# Patient Record
Sex: FEMALE | Employment: STUDENT | URBAN - METROPOLITAN AREA
[De-identification: names, ages, dates, MRNs, and addresses within clinical notes are randomized per-mention and may not be internally consistent; named-entity substitution may affect disease eponyms.]

---
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
## 2024-02-21 ENCOUNTER — OFFICE VISIT (OUTPATIENT)
Dept: SURGERY | Facility: CLINIC | Age: 19
End: 2024-02-21

## 2024-02-21 DIAGNOSIS — R82.90 URINE FINDING: ICD-10-CM

## 2024-02-21 DIAGNOSIS — N30.90 RECURRENT CYSTITIS: Primary | ICD-10-CM

## 2024-02-21 LAB
APPEARANCE: CLEAR
BILIRUBIN: NEGATIVE
GLUCOSE (URINE DIPSTICK): NEGATIVE MG/DL
KETONES (URINE DIPSTICK): NEGATIVE MG/DL
MULTISTIX LOT#: ABNORMAL NUMERIC
NITRITE, URINE: NEGATIVE
PH, URINE: 7 (ref 4.5–8)
PROTEIN (URINE DIPSTICK): NEGATIVE MG/DL
SPECIFIC GRAVITY: 1.02 (ref 1–1.03)
URINE-COLOR: YELLOW
UROBILINOGEN,SEMI-QN: 0.2 MG/DL (ref 0–1.9)

## 2024-02-21 PROCEDURE — 99243 OFF/OP CNSLTJ NEW/EST LOW 30: CPT | Performed by: PHYSICIAN ASSISTANT

## 2024-02-21 PROCEDURE — 81002 URINALYSIS NONAUTO W/O SCOPE: CPT | Performed by: PHYSICIAN ASSISTANT

## 2024-02-21 NOTE — PROGRESS NOTES
[facility], [facility]    Urology Consult Note    History of Present Illness:   Patient is a 18 year old female who presents today for consultation from Dr. Hester's office for recurrent cystitis symptoms.     Patient notes urinary urgency, bloating sensation, cramping sensation. Patient notes that she noticed a thin white discharge, occasional irritation.  Improves with pushing water. Currently consuming 32 oz water daily. No caffeine currently, didn't note that it got any worse. EtOH does seem to make it worse.   Regular bowel movements.   Started developing a few days ago, no current symptoms.   AZO bladder pain     Patient seen by gynecolgist 1/19/24 with note as follows:  Pt here with c/o recurrent urinary urgency, pelvic discomfort, dysuria x 4 mo.  Became sexually active 4 mo ago.  Dx'd with e-coli UTI in 10/2023, tx'd with macrobid with relief, but sx recurrent with next sexual activity (~2-3 weeks later)  Pt has had 2 negative follow up urine cultures through PCP since UTI tx of 10/2023 both of which were neg.  Chl/gc and affirm also neg (1/16/24).  Using nuvaring and condoms for contraception.   PLAN:   Repeat urine c/s, add testing for ureaplasma.  Discontinue use of latex condoms and any spermicide.  If above testing neg and sx persisting, will have pt f/u with urology.     HISTORY:  Past Medical History:   Diagnosis Date    Heart murmur     Panic attack       No past surgical history on file.   Family History   Problem Relation Age of Onset    Cancer Paternal Grandfather         Lung Cancer     Breast Cancer Maternal Great-Grandmother     Cancer Maternal Cousin Female         Thyroid Cancer     Breast Cancer Maternal Cousin Female       Social History:   Social History     Socioeconomic History    Marital status: Single   Tobacco Use    Smoking status: Never    Smokeless tobacco: Never   Vaping Use    Vaping Use: Never used   Substance and Sexual Activity    Alcohol  use: No     Alcohol/week: 0.0 standard drinks of alcohol    Drug use: No    Sexual activity: Never        Allergies  Allergies   Allergen Reactions    Honey RASH    Penicillins ANAPHYLAXIS    Dust Runny nose     Watery eyes        Review of Systems:   A 10-point review of systems was completed and is negative other than as noted above.    Physical Exam:   There were no vitals taken for this visit.    GENERAL APPEARANCE: well developed, well nourished, in no acute distress  NEUROLOGIC: no localizing neurologic signs, alert and oriented x 3, converses appropriately  HEAD: atraumatic, normocephalic  EYES: sclera non-icteric  ORAL CAVITY: mucosa moist  NECK/THYROID: no obvious masses or goiter  LUNGS: non-labored breathing  ABDOMEN: soft, nontender, nondistended  EXTREMITIES: warm, well-perfused. No clubbing, cyanosis or edema.  SKIN: no obvious rashes    Results:     Laboratory Data:  Lab Results   Component Value Date    WBC 6.1 02/16/2022    HGB 12.4 02/16/2022    .0 02/16/2022     Lab Results   Component Value Date     02/16/2022    K 3.7 02/16/2022     02/16/2022    CO2 25.0 02/16/2022    BUN 7 02/16/2022    GLU 84 02/16/2022    GFRAA 107 02/16/2022    AST 21 02/16/2022    ALT 46 02/16/2022    TP 7.2 02/16/2022    ALB 4.5 02/16/2022    CA 9.4 02/16/2022       Urinalysis Results (last three years):  Recent Labs     02/21/24  1314   SPECGRAVITY 1.020   PHURINE 7.0   NITRITE Negative       Urine Culture Results (last three years):  No results found for: \"URINECUL\"    Imaging  No results found.      Impression:     Patient is a 18 year old female who presents today for consultation from Dr. Hester's office for recurrent cystitis symptoms.     We reviewed cystitis tx and prevention strategies at today's visit and I provided educational materials for this. Discussed dietery and behavioral issues including cranberry / extract pills / supplements, fluids, voiding technique, post coital hygiene. Double  voiding, bending over after void to completely empty. I encouraged the patient to drink at least 40-60 oz water per day or enough to keep urine clear. I also reviewed dietary irritants and provided educational materials for this. We also discussed trying prn AZO for pain relief with plenty of water.    Will obtain renal/bladder ultrasound.     Recommendations:  [facility] PCR testing with STI. Renal/bladder ultrasound.  Recommend probiotic daily, 48 oz of water daily.   Pending above results will make further recommendations.     Thank you very much for this consult. Please call if there are any questions or concerns.     Leah Hines PA-C  Urology  [facility]    Date: 2/21/2024

## 2024-02-21 NOTE — PATIENT INSTRUCTIONS
Dietary Irritants    What you can do to help relieve your symptoms:    The purpose of this handout is to provide information that can help you discover what you are ingesting or doing that may be contributing to your recurrent irritative voiding symptoms and what steps you can take to relieve your discomfort.     Frequency, urgency, and pain on urination are symptoms of inflammation or irritation. These symptoms can be caused by bacterial infections or substances in the urine causing irritation to the lining of the bladder or urethra. Bacterial infections can be treated with antibiotics. But irritation of the bladder or urethra can results from other cause that will probably not respond to antibiotics.    What to do at the first sign of bladder or urethral discomfort:    The basic treatment for irritable bladder symptoms, whether induced by bacterial or nonbacterial irritants, is hydration. At the first sign of discomfort, start drinking 16 oz of water mixed with 1 teaspoon of baking soda. Then drink 8 oz of plain water every 20 minutes for the next 2-3 hours.     Repeat drinking 16 oz of water with baking soda in 3-4 hours. (Baking soda contains sodium and can cause fluid retention. If you have a history of high blood pressure, congestive heart failure, or renal disease, you should not use more than twice in 24 hours.)    You can take acetaminophen to relieve the pain (two extra strength or three regular strength tablets). Bladder analgesics such as phenazopyridine (Pyridium) may help and will not alter the results of a urine culture should the symptoms not be significantly relieved by diluting the urine and flushing out the bladder.     A warm bath to help muscles of the pelvic floor relax will also help lessen discomfort.     Before starting any antibiotic, a urine culture must be taken. If the conservative measures mentioned above are not helping, call the office to arrange for a urine culture. If the specimen  is contaminated with vaginal cells and bacteria and you are severely symptomatic, then a catheterized specimen should be obtained directly from your bladder to determine precisely whether bacterial infection is the cause of your symptoms.     Dietary irritants to the urinary tract to avoid:    Certain food can contribute to urinary frequency, urgency, and discomfort. If bladder symptoms are related to dietary factors, strict adherence to a diet that eliminated the food should bring significant relief in 10 days. Once you are feeling better, you can begin to add foods back into your diet one at a time. If the symptoms return, you will be able to identify the irritant. As you add foods back to your diet it is very important that you drink significant amounts of water. These foods are acidic and are considered irritants to the bladder. They should be avoided.    Alcoholic beverages  Apples and apple juice  Cantaloupe  Carbonated beverages  Chili and spicy foods  Citrus fruit  Chocolate  Coffee (including decaf)  Cranberries and juice  Grapes  Guava  Peaches  Pineapple  Plums  Strawberries  Sugar*  Tea  Tomatoes  Vit B Complex  Vinegar  *Some women report that sugar flares their symptoms.    Low acid fruit substitutions include apricots, papaya, pears, and watermelon. Coffee drinkers can drink Kava or other low-acid instant drinks. Tea drinks can substitute non-citrus herbal and sun brewed teas. Calcium carbonate co-buffered with calcium ascorbate can be substituted for Vitamin C.     Urinary tract infections can affect your general health and your quality of life.  Some UTI’s can be prevented.  Here are some suggestions that my help prevent frequent UTI’s.     Good Hygiene: Always wipe front to back.  Don’t use perfumed soaps.  Plain soaps like Ivory are the best.  Don’t use washcloths.  Place soap directly on your hands and clean the genital area.  Rinse thoroughly.  Soap can be very irritating to the vaginal  mucosa.     Urination: Urinate every 2-3 hours during the day.  Empty your bladder just before going to bed and  first thing when you wake up.  Make sure you go to the bathroom when you have a strong urge. Don't hover over the toilet to urinate.  The bladder may not be completely emptying when using this technique.  Sometimes you may need to \"double-void\".  After you finish urinating, stand up, then sit back down to urinate again.     Clothing: Wear cotton underwear. Change daily.  Avoid tight jeans.       [place]: Sometimes UTI’s are related to intercourse.  Wash genital area before and rinse afterwards.  Empty your bladder before and after intercourse.  Use of vaginal lubricants may be helpful.  Condoms and some lubricants may be irritating to the vaginal mucosa.  Spermicide should be avoided.  Talk to your doctor, you may require a suppressive antibiotic to take after intercourse.     Supplements: Take Vitamin C 500 or 1000mg daily.  Cranberry pills 400mg daily or if symptomatic 400mg two times per day.  Eat yogurt or consider taking a probiotic.  D-mannose 1 gram is another supplement that can help prevent infections.  This one is harder to find, but can be found at stores such as [employer], [employer] / [employer], or a specialized vitamin store.  Fluid intake should be at least 48oz daily with the goal of 64oz daily.  Water is preferable.      Constipation: Constipation has been linked to UTI’s.  You should be having a soft BM daily.  Dietary fiber should be between 20-25 grams daily.  Flaxseed, All-Bran cereal, Fiber One bars, fruits and vegetables are a good sources of fiber.  Alternatively, Metamucil can be used daily.  Gentle laxatives and stool softeners may be added on a daily or as needed basis if necessary (Miralax, Colace, Pericolace).      Vaginal creams and moisturizers: It may be recommended you try vaginal creams, moisturizers or lubricants as a prevention method.  Over-the-Counter  products:  Replens:  1 applicator three times per week  Luvena prebiotic vaginal moisturizer and lubricant:  package of 6.  1 tube every three days at bedtime.  Helps restore pH balance, decrease vaginal dryness, odor and itchiness.  KY liquibeads  KY silk - moisture enhancer  MeAgain Vaginal Moisturizer.  8 per package.  Use 1 daily for 7 days then 1 daily two times per week.  Astroglide  Prescriptions:  Estrace Cream  Premarin Cream  E-string     Prescription medications: Your doctor may recommend daily or as needed prescription medications including antibiotics to prevent urinary tract infection as well.

## 2024-02-26 ENCOUNTER — TELEPHONE (OUTPATIENT)
Dept: SURGERY | Facility: CLINIC | Age: 19
End: 2024-02-26

## 2024-02-26 RX ORDER — CIPROFLOXACIN 500 MG/1
500 TABLET, FILM COATED ORAL 2 TIMES DAILY
Qty: 20 TABLET | Refills: 0 | Status: SHIPPED | OUTPATIENT
Start: 2024-02-26 | End: 2024-03-07

## 2024-02-26 NOTE — TELEPHONE ENCOUNTER
Received [facility] PCR test results back.   E Coli >100K and Klebsiella 10-50K    Cipro > Fosfomycin > Levofloxacin > T/S     Recommend Cipro Bid x 10 days.    S/w mother regarding results. Other number listed went to father. Abx to pharmacy.

## 2024-03-29 ENCOUNTER — HOSPITAL ENCOUNTER (OUTPATIENT)
Dept: ULTRASOUND IMAGING | Age: 19
Discharge: HOME | End: 2024-03-29
Attending: PHYSICIAN ASSISTANT
Payer: COMMERCIAL

## 2024-03-29 DIAGNOSIS — N30.90 RECURRENT CYSTITIS: ICD-10-CM

## 2024-03-29 PROCEDURE — 76770 US EXAM ABDO BACK WALL COMP: CPT | Performed by: PHYSICIAN ASSISTANT

## 2024-05-28 ENCOUNTER — OFFICE VISIT (OUTPATIENT)
Dept: SURGERY | Facility: CLINIC | Age: 19
End: 2024-05-28

## 2024-05-28 DIAGNOSIS — N39.0 RECURRENT UTI: Primary | ICD-10-CM

## 2024-05-28 DIAGNOSIS — R82.90 URINE FINDING: ICD-10-CM

## 2024-05-28 LAB
APPEARANCE: CLEAR
BILIRUBIN: NEGATIVE
GLUCOSE (URINE DIPSTICK): NEGATIVE MG/DL
KETONES (URINE DIPSTICK): NEGATIVE MG/DL
LEUKOCYTES: NEGATIVE
MULTISTIX LOT#: ABNORMAL NUMERIC
NITRITE, URINE: NEGATIVE
PH, URINE: 6.5 (ref 4.5–8)
PROTEIN (URINE DIPSTICK): NEGATIVE MG/DL
SPECIFIC GRAVITY: 1 (ref 1–1.03)
URINE-COLOR: YELLOW
UROBILINOGEN,SEMI-QN: 0.2 MG/DL (ref 0–1.9)

## 2024-05-28 PROCEDURE — 99213 OFFICE O/P EST LOW 20 MIN: CPT | Performed by: PHYSICIAN ASSISTANT

## 2024-05-28 PROCEDURE — 81003 URINALYSIS AUTO W/O SCOPE: CPT | Performed by: PHYSICIAN ASSISTANT

## 2024-05-28 PROCEDURE — 51798 US URINE CAPACITY MEASURE: CPT | Performed by: PHYSICIAN ASSISTANT

## 2024-05-28 RX ORDER — FLUCONAZOLE 150 MG/1
TABLET ORAL
COMMUNITY
Start: 2024-04-03

## 2024-05-28 RX ORDER — MULTIVITAMIN
TABLET ORAL AS DIRECTED
COMMUNITY

## 2024-05-28 RX ORDER — NYSTATIN 100000 U/G
1 OINTMENT TOPICAL 2 TIMES DAILY
COMMUNITY
Start: 2024-05-09

## 2024-05-28 RX ORDER — CLOTRIMAZOLE AND BETAMETHASONE DIPROPIONATE 10; .64 MG/G; MG/G
1 CREAM TOPICAL 2 TIMES DAILY
COMMUNITY
Start: 2024-04-04

## 2024-05-28 RX ORDER — NITROFURANTOIN 25; 75 MG/1; MG/1
CAPSULE ORAL
COMMUNITY
Start: 2024-01-16

## 2024-05-28 RX ORDER — CIPROFLOXACIN 500 MG/1
500 TABLET, FILM COATED ORAL 2 TIMES DAILY
Qty: 10 TABLET | Refills: 0 | Status: SHIPPED | OUTPATIENT
Start: 2024-05-28 | End: 2024-05-31

## 2024-05-28 RX ORDER — ETONOGESTREL AND ETHINYL ESTRADIOL VAGINAL RING .015; .12 MG/D; MG/D
1 RING VAGINAL
COMMUNITY
Start: 2024-02-23

## 2024-05-28 RX ORDER — FLUCONAZOLE 150 MG/1
150 TABLET ORAL ONCE
Qty: 1 TABLET | Refills: 1 | Status: SHIPPED | OUTPATIENT
Start: 2024-05-28 | End: 2024-05-28

## 2024-05-28 NOTE — PROGRESS NOTES
Subjective:   Patient is a 19 year old female who presents today for follow up UTI symptoms.     Patient seen in February 2024 for consult. PCR testing did show E Coli and Klebsiella. She was treated with Cipro BID x 10 days with resolution of symptoms.    She returns today for UTI symptoms. Urinary urgency, dysuria over the last 1 month. UA trace blood, PVR 73mL.     Diagnosed with yeast infection 4/3/24.Prescribed fluconazole x 2 and topical for 10 days with improvement of symptoms. Was [facility] at [facility]/30/24 for dysuria, urinary urgency, frequency. UA did not appears infectious. She was seen by gynecologist 5/9/24 and suspected that she was having an allergic reaction to nuvaring. Nuvaring has been removed since then and she is doing well from gynecology.     PRIOR NOTE:  Patient notes urinary urgency, bloating sensation, cramping sensation. Patient notes that she noticed a thin white discharge, occasional irritation.  Improves with pushing water. Currently consuming 32 oz water daily. No caffeine currently, didn't note that it got any worse. EtOH does seem to make it worse.   Regular bowel movements.   Started developing a few days ago, no current symptoms.   AZO bladder pain      Patient seen by gynecolgist 1/19/24 with note as follows:  Pt here with c/o recurrent urinary urgency, pelvic discomfort, dysuria x 4 mo.  Became sexually active 4 mo ago.  Dx'd with e-coli UTI in 10/2023, tx'd with macrobid with relief, but sx recurrent with next sexual activity (~2-3 weeks later)  Pt has had 2 negative follow up urine cultures through PCP since UTI tx of 10/2023 both of which were neg.  Chl/gc and affirm also neg (1/16/24).  Using nuvaring and condoms for contraception.   PLAN:   Repeat urine c/s, add testing for ureaplasma.  Discontinue use of latex condoms and any spermicide.  If above testing neg and sx persisting, will have pt f/u with urology.     History/Other:    Chief Complaint Reviewed and Verified  Nursing  Notes Reviewed and   Verified  Allergies Reviewed  Medications Reviewed         Current Outpatient Medications   Medication Sig Dispense Refill    clotrimazole-betamethasone 1-0.05 % External Cream Apply 1 Application topically 2 (two) times daily. (Patient not taking: Reported on 5/28/2024)      Etonogestrel-Ethinyl Estradiol 0.12-0.015 MG/24HR Vaginal Ring Place 1 each vaginally every 28 days. (Patient not taking: Reported on 5/28/2024)      fluconazole 150 MG Oral Tab Following 3 dose treatment, begin weekly diflucan x 3 months (Patient not taking: Reported on 5/28/2024)      Multiple Vitamin (MULTI-VITAMIN DAILY) Oral Tab Take by mouth As Directed. (Patient not taking: Reported on 5/28/2024)      nitrofurantoin monohydrate macro 100 MG Oral Cap  (Patient not taking: Reported on 5/28/2024)      nystatin 100,000 Units/g External Ointment Apply 1 Application topically 2 (two) times daily. (Patient not taking: Reported on 5/28/2024)      VALACYCLOVIR 1 G Oral Tab TAKE 2 TABLETS(2000 MG) BY MOUTH TWICE DAILY FOR 1 DAY (Patient not taking: Reported on 5/28/2024) 4 tablet 0       Review of Systems:  Pertinent items are noted in HPI.      Objective:   There were no vitals taken for this visit. Estimated body mass index is 21.97 kg/m² as calculated from the following:    Height as of 2/16/22: 5' 4\" (1.626 m).    Weight as of 2/16/22: 128 lb (58.1 kg).    No distress  Non labored respirations      Laboratory Data:  Lab Results   Component Value Date    WBC 6.1 02/16/2022    HGB 12.4 02/16/2022    .0 02/16/2022     Lab Results   Component Value Date     02/16/2022    K 3.7 02/16/2022     02/16/2022    CO2 25.0 02/16/2022    BUN 7 02/16/2022    GLU 84 02/16/2022    GFRAA 107 02/16/2022    AST 21 02/16/2022    ALT 46 02/16/2022    TP 7.2 02/16/2022    ALB 4.5 02/16/2022    CA 9.4 02/16/2022       Urinalysis Results (last three years):  Recent Labs     02/21/24  1314 05/28/24  1345   SPECGRAVITY 1.020  1.005   PHURINE 7.0 6.5   NITRITE Negative Negative       Urine Culture Results (last three years):  No results found for: \"URINECUL\"    Imaging  No results found.    Assessment & Plan:   Recurrent UTI  AML  Cipro Bid x 10 days , fluconazole also sent for prn use if yeast infection  Send urine for PCR testing  D Mannose and ultra kendrick probiotic  Repeat renal ultrasound in 6 months.     Leah Hines PA-C, 5/28/2024

## 2024-05-28 NOTE — PATIENT INSTRUCTIONS
Urinary tract infections can affect your general health and your quality of life.  Some UTI’s can be prevented.  Here are some suggestions that my help prevent frequent UTI’s.     Good Hygiene: Always wipe front to back.  Don’t use perfumed soaps.  Plain soaps like Ivory are the best.  Don’t use washcloths.  Place soap directly on your hands and clean the genital area.  Rinse thoroughly.  Soap can be very irritating to the vaginal mucosa.     Urination: Urinate every 2-3 hours during the day.  Empty your bladder just before going to bed and  first thing when you wake up.  Make sure you go to the bathroom when you have a strong urge. Don't hover over the toilet to urinate.  The bladder may not be completely emptying when using this technique.  Sometimes you may need to \"double-void\".  After you finish urinating, stand up, then sit back down to urinate again.     Clothing: Wear cotton underwear. Change daily.  Avoid tight jeans.       [place]: Sometimes UTI’s are related to intercourse.  Wash genital area before and rinse afterwards.  Empty your bladder before and after intercourse.  Use of vaginal lubricants may be helpful.  Condoms and some lubricants may be irritating to the vaginal mucosa.  Spermicide should be avoided.  Talk to your doctor, you may require a suppressive antibiotic to take after intercourse.     Supplements: Take Vitamin C 500 or 1000mg daily.  Cranberry pills 400mg daily or if symptomatic 400mg two times per day.  Eat yogurt or consider taking a probiotic.  D-mannose 1 gram is another supplement that can help prevent infections.  This one is harder to find, but can be found at stores such as [employer] ([employer]), [employer], or a specialized vitamin store.  Fluid intake should be at least 48oz daily with the goal of 64oz daily.  Water is preferable.      Constipation: Constipation has been linked to UTI’s.  You should be having a soft BM daily.  Dietary fiber should be between 20-25 grams daily.   Flaxseed, All-Bran cereal, Fiber One bars, fruits and vegetables are a good sources of fiber.  Alternatively, Metamucil can be used daily.  Gentle laxatives and stool softeners may be added on a daily or as needed basis if necessary (Miralax, Colace, Pericolace).      Vaginal creams and moisturizers: It may be recommended you try vaginal creams, moisturizers or lubricants as a prevention method.  Over-the-Counter products:  Replens:  1 applicator three times per week  Luvena prebiotic vaginal moisturizer and lubricant:  package of 6.  1 tube every three days at bedtime.  Helps restore pH balance, decrease vaginal dryness, odor and itchiness.  KY liquibeads  KY silk - moisture enhancer  MeAgain Vaginal Moisturizer.  8 per package.  Use 1 daily for 7 days then 1 daily two times per week.  Astroglide  Prescriptions:  Estrace Cream  Premarin Cream  E-string     Prescription medications: Your doctor may recommend daily or as needed prescription medications including antibiotics to prevent urinary tract infection as well.    [email or web address]

## 2024-05-31 ENCOUNTER — TELEPHONE (OUTPATIENT)
Dept: SURGERY | Facility: CLINIC | Age: 19
End: 2024-05-31

## 2024-05-31 RX ORDER — CIPROFLOXACIN 500 MG/1
500 TABLET, FILM COATED ORAL 2 TIMES DAILY
Qty: 20 TABLET | Refills: 0 | Status: SHIPPED | OUTPATIENT
Start: 2024-05-31 | End: 2024-06-10

## 2024-05-31 NOTE — TELEPHONE ENCOUNTER
Per pharmacist needs clarification on quantity for cipro, quantity given is only for 5 days, but was told by patient its supposed to be for 10 days. Please advise thank you.

## 2024-06-03 ENCOUNTER — TELEPHONE (OUTPATIENT)
Dept: SURGERY | Facility: CLINIC | Age: 19
End: 2024-06-03

## 2024-06-12 ENCOUNTER — OFFICE VISIT (OUTPATIENT)
Dept: OBGYN CLINIC | Facility: CLINIC | Age: 19
End: 2024-06-12
Payer: COMMERCIAL

## 2024-06-12 VITALS
HEART RATE: 111 BPM | HEIGHT: 64 IN | SYSTOLIC BLOOD PRESSURE: 127 MMHG | DIASTOLIC BLOOD PRESSURE: 76 MMHG | WEIGHT: 130.06 LBS | BODY MASS INDEX: 22.2 KG/M2

## 2024-06-12 DIAGNOSIS — N89.8 VAGINAL IRRITATION: Primary | ICD-10-CM

## 2024-06-12 DIAGNOSIS — N76.1 CHRONIC VAGINITIS: ICD-10-CM

## 2024-06-12 DIAGNOSIS — N94.11 INTROITAL DYSPAREUNIA: ICD-10-CM

## 2024-06-12 DIAGNOSIS — N94.2 VAGINISMUS: ICD-10-CM

## 2024-06-12 DIAGNOSIS — N95.2 VAGINAL ATROPHY: ICD-10-CM

## 2024-06-12 LAB
APPEARANCE: CLEAR
BILIRUBIN: NEGATIVE
GLUCOSE (URINE DIPSTICK): NEGATIVE MG/DL
KETONES (URINE DIPSTICK): NEGATIVE MG/DL
LEUKOCYTES: NEGATIVE
MULTISTIX LOT#: ABNORMAL NUMERIC
NITRITE, URINE: NEGATIVE
PH, URINE: 5.5 (ref 4.5–8)
PROTEIN (URINE DIPSTICK): NEGATIVE MG/DL
SPECIFIC GRAVITY: 1.03 (ref 1–1.03)
URINE-COLOR: YELLOW
UROBILINOGEN,SEMI-QN: 0.2 MG/DL (ref 0–1.9)

## 2024-06-12 PROCEDURE — 3008F BODY MASS INDEX DOCD: CPT | Performed by: STUDENT IN AN ORGANIZED HEALTH CARE EDUCATION/TRAINING PROGRAM

## 2024-06-12 PROCEDURE — 3078F DIAST BP <80 MM HG: CPT | Performed by: STUDENT IN AN ORGANIZED HEALTH CARE EDUCATION/TRAINING PROGRAM

## 2024-06-12 PROCEDURE — 3074F SYST BP LT 130 MM HG: CPT | Performed by: STUDENT IN AN ORGANIZED HEALTH CARE EDUCATION/TRAINING PROGRAM

## 2024-06-12 PROCEDURE — 99204 OFFICE O/P NEW MOD 45 MIN: CPT | Performed by: STUDENT IN AN ORGANIZED HEALTH CARE EDUCATION/TRAINING PROGRAM

## 2024-06-12 PROCEDURE — 81002 URINALYSIS NONAUTO W/O SCOPE: CPT | Performed by: STUDENT IN AN ORGANIZED HEALTH CARE EDUCATION/TRAINING PROGRAM

## 2024-06-12 RX ORDER — ESTRADIOL 0.1 MG/G
CREAM VAGINAL
Qty: 42.5 G | Refills: 3 | Status: SHIPPED | OUTPATIENT
Start: 2024-06-12

## 2024-06-12 NOTE — PROGRESS NOTES
[facility]  Obstetrics and Gynecology   History & Physical    Chief complaint:   Chief Complaint   Patient presents with    New Patient     Establish care     Contraception     Had an allergic reaction to the nuvaring, had to take it out     Vaginal Problem     Constant discomfort, irritation, intercourse is extremely painful.         HPI: Susanne Maddox is a 19 year old  with Patient's last menstrual period was 2024 (exact date).      Pt has seen multiple gynecologists and also urology for vulvar pain (and possible irritative voiding sx) associated with sex  Pt says that ever since she became sexually active with boyfriend, \"everything has changed\" with vulvar sx. Never had itching or irritation beforehand. Now has burning pain, itching at vagina every time with intercourse, even just if he touches her vulva  Vagina feels dry, they use lubricant but doesn't help.   No abnormal discharge - barely any discharge at all  The pain is \"just inside the vaginal opening\", not deep dyspareunia  Makes sex constantly painful and makes her have no interest    She did have positive result for yeast infection 3/2024 and UTI in 10/2023 and 2024 - all were treated.   However negative vaginitis tests  and May 2024, otherwise negative Ucx  Negative STI testing  & mar 2024  She really doesn't think this is an infection  because when she did have yeast infection that one time she did have thick white discharge and that resolved with treatment. Thinks yeast inf triggered by abx she had taken for UTI  Taking probiotics, using unscented soap/wash for sensitive skin  She had been using nuvaring and a prior GYN suggested she may have an allergy so she stopped this. That helped a little - made the vulva \"less swollen\"  Now taking OCP  Thinking maybe sx started around the time of getting on birth control.    Menses prior to OCP were painful and irregular - now improved on OCP    Hx Prior Abnormal Pap:  No    PMHx/Surghx reviewed, below. Of note: uncomplicated      PCP: Aniyah Hester MD    Review of Systems:  Constitutional:  Denies fatigue, night sweats, hot flashes  Eyes:  denies blurred or double vision  Cardiovascular:  denies chest pain or palpitations  Respiratory:  denies shortness of breath  Gastrointestinal:  denies heartburn, abdominal pain, diarrhea or constipation  Genitourinary:  denies dysuria, incontinence, abnormal vaginal discharge, +vaginal itching  Musculoskeletal:  denies back pain.  Skin/Breast:  Denies any breast pain, lumps, or discharge.   Neurological:  denies headaches, extremity weakness or numbness.  Psychiatric: denies depression or anxiety.  Endocrine:   denies excessive thirst or urination.  Heme/Lymph:  denies history of anemia, easy bruising or bleeding.    OB History:  OB History    Para Term  AB Living   0 0 0 0 0 0   SAB IAB Ectopic Multiple Live Births   0 0 0 0 0       Meds:  Current Outpatient Medications on File Prior to Visit   Medication Sig Dispense Refill    NORETHINDRONE OR Take by mouth.      VALACYCLOVIR 1 G Oral Tab TAKE 2 TABLETS(2000 MG) BY MOUTH TWICE DAILY FOR 1 DAY (Patient taking differently: as needed.) 4 tablet 0    clotrimazole-betamethasone 1-0.05 % External Cream Apply 1 Application topically 2 (two) times daily. (Patient not taking: Reported on 2024)      fluconazole 150 MG Oral Tab Following 3 dose treatment, begin weekly diflucan x 3 months (Patient not taking: Reported on 2024)      Multiple Vitamin (MULTI-VITAMIN DAILY) Oral Tab Take by mouth As Directed. (Patient not taking: Reported on 2024)      nitrofurantoin monohydrate macro 100 MG Oral Cap  (Patient not taking: Reported on 2024)      nystatin 100,000 Units/g External Ointment Apply 1 Application topically 2 (two) times daily. (Patient not taking: Reported on 2024)       No current facility-administered medications on file prior to visit.        All:  Allergies   Allergen Reactions    Honey RASH    Penicillins ANAPHYLAXIS    Dust Runny nose     Watery eyes     Nuvaring [Etonogestrel-Ethinyl Estradiol] ITCHING       PMH:  Past Medical History:    Heart murmur    Panic attack       PSH:  History reviewed. No pertinent surgical history.    Social History:  Social History     Socioeconomic History    Marital status: Single     Spouse name: Not on file    Number of children: Not on file    Years of education: Not on file    Highest education level: Not on file   Occupational History    Not on file   Tobacco Use    Smoking status: Never    Smokeless tobacco: Never   Vaping Use    Vaping status: Never Used   Substance and Sexual Activity    Alcohol use: No     Alcohol/week: 0.0 standard drinks of alcohol    Drug use: No    Sexual activity: Never   Other Topics Concern    Not on file   Social History Narrative    Not on file     Social Determinants of Health     Financial Resource Strain: Not on file   Food Insecurity: Not on file   Transportation Needs: Not on file   Physical Activity: Not on file   Stress: Not on file   Social Connections: Not on file   Housing Stability: Not on file        Family History:  Family History   Problem Relation Age of Onset    Cancer Paternal Grandfather         Lung Cancer     Breast Cancer Maternal Great-Grandmother     Cancer Maternal Cousin Female         Thyroid Cancer     Breast Cancer Maternal Cousin Female        PHYSICAL EXAM:     Vitals:    06/12/24 1442   BP: 127/76   Pulse: 111   Weight: 130 lb 1.1 oz (59 kg)   Height: 64\"       Body mass index is 22.33 kg/m².      Constitutional: well developed, well nourished  Head/Face: normocephalic  Skin/Hair: no unusual rashes or bruises  Extremities: no edema, no cyanosis  Psychiatric:  Oriented to time, place, person and situation. Appropriate mood and affect    Pelvic Exam:  External Genitalia: normal appearance, hair distribution, and no lesions  Urethral Meatus:  normal  in size, location, without lesions and prolapse  Bladder:  No fullness, masses or tenderness  Vagina:  hypoestrogenic/atrophic appearing, there is also fissure/abrasion at posterior fourchette  Introitus is very narrow, +hypertonic pelvic floor   no abnormal discharge  Q-tip test at vestibule is NEGATIVE for vulvodynia  Cervix:  Normal without lesions       Assessment & Plan:     Susanne Maddox is a 19 year old      Diagnoses and all orders for this visit:    Vaginal irritation  -     VIKOR VAGINAL ID; Future    Chronic vaginitis  -     VIKOR VAGINAL ID; Future    Introital dyspareunia    Vaginismus    Vaginal atrophy  -     estradiol 0.1 MG/GM Vaginal Cream; 0.5 grams (pea-sized amount) nightly for 10 weeks, then taper to 2-3 times per week thereafter.       Vaginal ID swab sent to rule out less common infectious causes of vaginitis - d/w pt sometimes testing may come back with findings of normal kendrick/nonpathogenic organisms (like E coli)  Discussed vaginal dryness/atrophy as potential side effect of OCPs - Going to try going off OCP - see if improves hypoestrogenic appearance of vagina  Rx estrogen cream as sent - she will start that if no improvement off OCP  Pt considering nexplanon or IUD for contraception in future  Discussed pelvic floor PT as option as well, there is vaginismus on exam - she would like to hold off for now  We did also discuss could be a size mismatch with partner - on exam pt's introitus is definitely, notably small. Hopefully estrogen will help, but pelvic floor PT may be indicated      Encounter Times  Pre-Chartin minutes Reviewing/Obtaining History: 15 minutes   Medical Exam: 5 minutes Plan (ordering tests, medications): 5 minutes   Notes: 5 minutes Counseling/Education: 10 minutes   Referring/Communicatin minutes Independent Interpretation of imagin minutes   Care Coordination: 0 minutes    My total time spent caring for the patient on the day of the encounter: 45  minutes.        Mayela Mendez MD  [facility] - OBGYN

## 2024-06-27 ENCOUNTER — TELEPHONE (OUTPATIENT)
Facility: CLINIC | Age: 19
End: 2024-06-27

## 2024-08-28 ENCOUNTER — HOSPITAL ENCOUNTER (EMERGENCY)
Facility: HOSPITAL | Age: 19
Discharge: HOME | End: 2024-08-29
Attending: PEDIATRICS
Payer: COMMERCIAL

## 2024-08-28 VITALS
TEMPERATURE: 98 F | WEIGHT: 136.44 LBS | HEART RATE: 89 BPM | BODY MASS INDEX: 23 KG/M2 | DIASTOLIC BLOOD PRESSURE: 58 MMHG | RESPIRATION RATE: 16 BRPM | SYSTOLIC BLOOD PRESSURE: 117 MMHG | OXYGEN SATURATION: 100 %

## 2024-08-28 DIAGNOSIS — Z20.9 EXPOSURE TO BAT WITHOUT KNOWN BITE: Primary | ICD-10-CM

## 2024-08-28 PROCEDURE — 99283 EMERGENCY DEPT VISIT LOW MDM: CPT

## 2024-08-28 PROCEDURE — 99282 EMERGENCY DEPT VISIT SF MDM: CPT
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
## 2024-09-04 NOTE — TELEPHONE ENCOUNTER
Notified the patient's father of the below lab results. Patient's father verbalized understanding. Answered all questions at this time. Pt called Transplant to schedule CTA Coronary after she saw the result note on MyChart from her Stress Test. They were unable to schedule due to not seeing an order/referral in chart. Please add and the pt is agreeable to schedule.

## 2024-10-30 ENCOUNTER — TELEPHONE (OUTPATIENT)
Facility: CLINIC | Age: 19
End: 2024-10-30

## 2024-10-30 ENCOUNTER — OFFICE VISIT (OUTPATIENT)
Dept: OBGYN CLINIC | Facility: CLINIC | Age: 19
End: 2024-10-30
Payer: COMMERCIAL

## 2024-10-30 VITALS
DIASTOLIC BLOOD PRESSURE: 77 MMHG | HEART RATE: 91 BPM | BODY MASS INDEX: 23.36 KG/M2 | HEIGHT: 64 IN | WEIGHT: 136.81 LBS | SYSTOLIC BLOOD PRESSURE: 114 MMHG

## 2024-10-30 DIAGNOSIS — N94.11 INTROITAL DYSPAREUNIA: ICD-10-CM

## 2024-10-30 DIAGNOSIS — N95.2 VAGINAL ATROPHY: ICD-10-CM

## 2024-10-30 DIAGNOSIS — T38.4X5S: Primary | ICD-10-CM

## 2024-10-30 DIAGNOSIS — Z30.430 ENCOUNTER FOR IUD INSERTION: ICD-10-CM

## 2024-10-30 DIAGNOSIS — T38.4X5S: ICD-10-CM

## 2024-10-30 PROCEDURE — 3008F BODY MASS INDEX DOCD: CPT | Performed by: STUDENT IN AN ORGANIZED HEALTH CARE EDUCATION/TRAINING PROGRAM

## 2024-10-30 PROCEDURE — 3078F DIAST BP <80 MM HG: CPT | Performed by: STUDENT IN AN ORGANIZED HEALTH CARE EDUCATION/TRAINING PROGRAM

## 2024-10-30 PROCEDURE — 3074F SYST BP LT 130 MM HG: CPT | Performed by: STUDENT IN AN ORGANIZED HEALTH CARE EDUCATION/TRAINING PROGRAM

## 2024-10-30 PROCEDURE — 99213 OFFICE O/P EST LOW 20 MIN: CPT | Performed by: STUDENT IN AN ORGANIZED HEALTH CARE EDUCATION/TRAINING PROGRAM

## 2024-10-30 NOTE — TELEPHONE ENCOUNTER
Spoke with patient. She is aware of her scheduled surgery for 11/25/24 at 0915 am. [employer] message sent with additional instructions. Understanding verbalized.

## 2024-10-30 NOTE — PROGRESS NOTES
[facility]  Obstetrics and Gynecology   Established Patient Visit    Chief complaint:   Chief Complaint   Patient presents with    Contraception     -Patient would like to discuss getting IUD placed in OR vs. Other birth options   -Patient reports she did take a Plan B yesterday          HPI: Susanne Maddox is a 19 year old  with Patient's last menstrual period was 10/23/2024 (exact date).      I initially saw pt 2024 for vulvar pain/itching/irritation related to intercourse, vaginal dryness. Had been treated and tested multiple times for vaginitis and UTI, tested for STI but sx did not resolve  At that time was on OCP, on exam there was considerable dryness/atrophy as well as high tone pelvic floor so we discussed vaginal estrogen or going off OCP to see if sx would improve  We also discussed nexplanon or IUD as other contraceptive options. She had been on nuvaring in the past too and was actually suspected to have an allergic reaction to it with vaginal swelling      Since stopping OCP pt feels completely better. Like things are completely back to normal  She has taken plan B several times and that does seem to flare up symptoms, though  Also condoms are very irritating so she feels like the only choice she and her boyfriend have is to either not have sex or have unprotected sex  Interested in copper IUD, but has really never been able to tolerate speculum exam except for with the \"pediatric\" size    Periods are regular/monthly, can be heavy at times and does get cramps but are manageable  Feels like the benefit of hormone free contraception would outweigh the potential menstrual side effects of paragard IUD         Patient's last menstrual period was 10/23/2024 (exact date).      Meds:  Medications Ordered Prior to Encounter[1]      PHYSICAL EXAM:     Vitals:    10/30/24 1112   BP: 114/77   Pulse: 91   Weight: 136 lb 12.8 oz (62.1 kg)   Height: 64\"       Body mass index is 23.48  kg/m².    Exam:  General: NAD, appears well  Pelvic exam: deferred    Assessment & Plan:     Susanne Maddox is a 19 year old  female here for follow up     Diagnoses and all orders for this visit:    Adverse reaction to birth control pills, sequela    Vaginal atrophy    Encounter for IUD insertion    Introital dyspareunia         Discussed contraceptive options with pt, she has had significant side effects on systemic hormonal contraception and would like to try paragard/copper IUD    We discussed option of trying placement in clinic with cytotec but not sure if she will even be able to tolerate a speculum exam for long enough to place IUD. I feel it is reasonable to schedule IUD insertion under anesthesia in the OR    Will schedule for exam under anesthesia, insertion of copper/paragard IUD under ultrasound guidance in OR      Mayela Mendez MD  [facility] - OBGYN             [1]   Current Outpatient Medications on File Prior to Visit   Medication Sig Dispense Refill    miSOPROStol 200 MCG Oral Tab Place 1 tablet vaginally the night before procedure, then place 1 tablet vaginally the morning of procedure. (Patient not taking: Reported on 10/30/2024) 2 tablet 0    NORETHINDRONE OR Take by mouth. (Patient not taking: Reported on 10/30/2024)      estradiol 0.1 MG/GM Vaginal Cream 0.5 grams (pea-sized amount) nightly for 10 weeks, then taper to 2-3 times per week thereafter. (Patient not taking: Reported on 10/30/2024) 42.5 g 3    clotrimazole-betamethasone 1-0.05 % External Cream Apply 1 Application topically 2 (two) times daily. (Patient not taking: Reported on 10/30/2024)      Multiple Vitamin (MULTI-VITAMIN DAILY) Oral Tab Take by mouth As Directed. (Patient not taking: Reported on 10/30/2024)      nitrofurantoin monohydrate macro 100 MG Oral Cap  (Patient not taking: Reported on 10/30/2024)      nystatin 100,000 Units/g External Ointment Apply 1 Application topically 2 (two) times daily. (Patient not taking:  Reported on 10/30/2024)       No current facility-administered medications on file prior to visit.

## 2024-11-25 ENCOUNTER — HOSPITAL ENCOUNTER (OUTPATIENT)
Facility: HOSPITAL | Age: 19
Setting detail: HOSPITAL OUTPATIENT SURGERY
Discharge: HOME | End: 2024-11-25
Attending: STUDENT IN AN ORGANIZED HEALTH CARE EDUCATION/TRAINING PROGRAM | Admitting: STUDENT IN AN ORGANIZED HEALTH CARE EDUCATION/TRAINING PROGRAM
Payer: COMMERCIAL

## 2024-11-25 ENCOUNTER — APPOINTMENT (OUTPATIENT)
Dept: ULTRASOUND IMAGING | Facility: HOSPITAL | Age: 19
End: 2024-11-25
Attending: STUDENT IN AN ORGANIZED HEALTH CARE EDUCATION/TRAINING PROGRAM
Payer: COMMERCIAL

## 2024-11-25 ENCOUNTER — ANESTHESIA (OUTPATIENT)
Dept: SURGERY | Facility: HOSPITAL | Age: 19
End: 2024-11-25
Payer: COMMERCIAL

## 2024-11-25 ENCOUNTER — ANESTHESIA EVENT (OUTPATIENT)
Dept: SURGERY | Facility: HOSPITAL | Age: 19
End: 2024-11-25
Payer: COMMERCIAL

## 2024-11-25 VITALS
HEIGHT: 64 IN | SYSTOLIC BLOOD PRESSURE: 106 MMHG | TEMPERATURE: 98 F | HEART RATE: 72 BPM | WEIGHT: 139.81 LBS | RESPIRATION RATE: 16 BRPM | BODY MASS INDEX: 23.87 KG/M2 | OXYGEN SATURATION: 100 % | DIASTOLIC BLOOD PRESSURE: 65 MMHG

## 2024-11-25 DIAGNOSIS — N95.2 VAGINAL ATROPHY: ICD-10-CM

## 2024-11-25 PROBLEM — Z30.430 ENCOUNTER FOR INSERTION OF COPPER IUD: Status: ACTIVE | Noted: 2024-11-25

## 2024-11-25 PROBLEM — Z97.5 IUD (INTRAUTERINE DEVICE) IN PLACE: Status: ACTIVE | Noted: 2024-11-25

## 2024-11-25 LAB — B-HCG UR QL: NEGATIVE

## 2024-11-25 PROCEDURE — 76998 US GUIDE INTRAOP: CPT | Performed by: STUDENT IN AN ORGANIZED HEALTH CARE EDUCATION/TRAINING PROGRAM

## 2024-11-25 PROCEDURE — 0UH97HZ INSERTION OF CONTRACEPTIVE DEVICE INTO UTERUS, VIA NATURAL OR ARTIFICIAL OPENING: ICD-10-PCS | Performed by: STUDENT IN AN ORGANIZED HEALTH CARE EDUCATION/TRAINING PROGRAM

## 2024-11-25 PROCEDURE — 58300 INSERT INTRAUTERINE DEVICE: CPT | Performed by: STUDENT IN AN ORGANIZED HEALTH CARE EDUCATION/TRAINING PROGRAM

## 2024-11-25 DEVICE — PARAGARD IUD: Type: IMPLANTABLE DEVICE | Site: UTERUS | Status: FUNCTIONAL

## 2024-11-25 RX ORDER — ACETAMINOPHEN 500 MG
1000 TABLET ORAL ONCE
Status: DISCONTINUED | OUTPATIENT
Start: 2024-11-25 | End: 2024-11-25 | Stop reason: HOSPADM

## 2024-11-25 RX ORDER — ONDANSETRON 2 MG/ML
INJECTION INTRAMUSCULAR; INTRAVENOUS AS NEEDED
Status: DISCONTINUED | OUTPATIENT
Start: 2024-11-25 | End: 2024-11-25 | Stop reason: SURG

## 2024-11-25 RX ORDER — LIDOCAINE HYDROCHLORIDE 10 MG/ML
INJECTION, SOLUTION EPIDURAL; INFILTRATION; INTRACAUDAL; PERINEURAL AS NEEDED
Status: DISCONTINUED | OUTPATIENT
Start: 2024-11-25 | End: 2024-11-25 | Stop reason: SURG

## 2024-11-25 RX ORDER — SCOLOPAMINE TRANSDERMAL SYSTEM 1 MG/1
1 PATCH, EXTENDED RELEASE TRANSDERMAL ONCE
Status: DISCONTINUED | OUTPATIENT
Start: 2024-11-25 | End: 2024-11-25 | Stop reason: HOSPADM

## 2024-11-25 RX ORDER — SODIUM CHLORIDE, SODIUM LACTATE, POTASSIUM CHLORIDE, CALCIUM CHLORIDE 600; 310; 30; 20 MG/100ML; MG/100ML; MG/100ML; MG/100ML
INJECTION, SOLUTION INTRAVENOUS CONTINUOUS
Status: DISCONTINUED | OUTPATIENT
Start: 2024-11-25 | End: 2024-11-25

## 2024-11-25 RX ORDER — KETOROLAC TROMETHAMINE 30 MG/ML
INJECTION, SOLUTION INTRAMUSCULAR; INTRAVENOUS AS NEEDED
Status: DISCONTINUED | OUTPATIENT
Start: 2024-11-25 | End: 2024-11-25 | Stop reason: SURG

## 2024-11-25 RX ORDER — BUPIVACAINE HYDROCHLORIDE AND EPINEPHRINE 5; 5 MG/ML; UG/ML
INJECTION, SOLUTION EPIDURAL; INTRACAUDAL; PERINEURAL AS NEEDED
Status: DISCONTINUED | OUTPATIENT
Start: 2024-11-25 | End: 2024-11-25 | Stop reason: HOSPADM

## 2024-11-25 RX ADMIN — LIDOCAINE HYDROCHLORIDE 50 MG: 10 INJECTION, SOLUTION EPIDURAL; INFILTRATION; INTRACAUDAL; PERINEURAL at 09:41:00

## 2024-11-25 RX ADMIN — ONDANSETRON 4 MG: 2 INJECTION INTRAMUSCULAR; INTRAVENOUS at 10:10:00

## 2024-11-25 RX ADMIN — KETOROLAC TROMETHAMINE 30 MG: 30 INJECTION, SOLUTION INTRAMUSCULAR; INTRAVENOUS at 10:10:00

## 2024-11-25 RX ADMIN — SODIUM CHLORIDE, SODIUM LACTATE, POTASSIUM CHLORIDE, CALCIUM CHLORIDE: 600; 310; 30; 20 INJECTION, SOLUTION INTRAVENOUS at 10:19:00

## 2024-11-25 NOTE — ANESTHESIA PREPROCEDURE EVALUATION
PRE-OP EVALUATION    Patient Name: Susanne Maddox    Admit Diagnosis: Vaginal atrophy [N95.2]  Introital dyspareunia [N94.11]  Adverse reaction to birth control pills, sequela [T38.4X5S]    Pre-op Diagnosis: Vaginal atrophy [N95.2]  Introital dyspareunia [N94.11]  Adverse reaction to birth control pills, sequela [T38.4X5S]    Insertion of copper/paragard Intrauterine Device with ultrasound guidance under anesthesia    Anesthesia Procedure: Insertion of copper/paragard Intrauterine Device with ultrasound guidance under anesthesia    Surgeons and Role:     * Mayela Mendez MD - Primary    Pre-op vitals reviewed.  Temp: 98.1 °F (36.7 °C)  Pulse: 107  Resp: 16  BP: 121/64  SpO2: 100 %  Body mass index is 24 kg/m².    Current medications reviewed.  Hospital Medications:  • acetaminophen (Tylenol Extra Strength) tab 1,000 mg  1,000 mg Oral Once   • scopolamine (Transderm-Scop) 1 MG/3DAYS patch 1 patch  1 patch Transdermal Once   • lactated ringers infusion   Intravenous Continuous       Outpatient Medications:   Prescriptions Prior to Admission[1]    Allergies: Penicillins, Dust, and Nuvaring [etonogestrel-ethinyl estradiol]      Anesthesia Evaluation    Patient summary reviewed.    Anesthetic Complications  (-) history of anesthetic complications         GI/Hepatic/Renal    Negative GI/hepatic/renal ROS.                             Cardiovascular    Negative cardiovascular ROS.    Exercise tolerance: good     MET: >4                                           Endo/Other    Negative endo/other ROS.                              Pulmonary  Comment: Pt has dry cough. That this is elective and she can wait until she is fully healthy. She chose to procede  Negative pulmonary ROS.             (+) recent URI          Neuro/Psych    Negative neuro/psych ROS.                              Past Surgical History:   Procedure Laterality Date   • Dental surgery procedure  08/2024    wisdom teeth removal and bone graft     Social  History     Socioeconomic History   • Marital status: Single   Tobacco Use   • Smoking status: Never   • Smokeless tobacco: Never   Vaping Use   • Vaping status: Never Used   Substance and Sexual Activity   • Alcohol use: No     Alcohol/week: 0.0 standard drinks of alcohol   • Drug use: No   • Sexual activity: Yes     Partners: Male     History   Drug Use No     Available pre-op labs reviewed.               Airway      Mallampati: II  Mouth opening: 3 FB  TM distance: 4 - 6 cm  Neck ROM: full Cardiovascular    Cardiovascular exam normal.         Dental  Comment: Normal wear/minor imperfections and discoloration noted. No grossly weakened or damaged teeth on exam.           Pulmonary    Pulmonary exam normal.                 Other findings        ASA: 1   Plan: MAC  NPO status verified and patient meets guidelines.        Comment: We have decided on MAC for this procedure. MAC stands for Monitored Anesthesia Care and is a type of sedation. The patient will be given medications through the IV to relax the patient and help with pain control. During MAC the patient will be breathing on their own during the case. The patient has a risk of awareness before, during and after the procedure. There is also a risk of requiring general anesthesia and placement of a breathing tube should the patient not breathe well or should MAC not provide adequate sedation for the patient to tolerate the procedure. All questions were answered.                  Present on Admission:  **None**             [1]   No medications prior to admission.

## 2024-11-25 NOTE — ANESTHESIA POSTPROCEDURE EVALUATION
[facility]    Susanne Maddox Patient Status:  Hospital Outpatient Surgery   Age/Gender 19 year old female MRN [id number]   Location [facility] SURGERY Attending Mayela Mendez MD   Hosp Day # 0 PCP Aniyah Hester MD       Anesthesia Post-op Note    Insertion of copper/paragard Intrauterine Device with ultrasound guidance under anesthesia    Procedure Summary       Date: 11/25/24 Room / Location:  MAIN OR 02 /  MAIN OR    Anesthesia Start: 0938 Anesthesia Stop: 1019    Procedure: Insertion of copper/paragard Intrauterine Device with ultrasound guidance under anesthesia (Vagina ) Diagnosis:       Vaginal atrophy      Introital dyspareunia      Adverse reaction to birth control pills, sequela      (Vaginal atrophy [N95.2]Introital dyspareunia [N94.11]Adverse reaction to birth control pills, sequela [T38.4X5S])    Surgeons: Mayela Mendez MD Anesthesiologist: Rios Prince MD    Anesthesia Type: MAC ASA Status: 1            Anesthesia Type: No value filed.    Vitals Value Taken Time   /47 11/25/24 1023   Temp 97.6 11/25/24 1023   Pulse 85 11/25/24 1023   Resp 14 11/25/24 1023   SpO2 99 11/25/24 1023       Patient Location: PACU    Anesthesia Type: MAC    Airway Patency: patent    Postop Pain Control: adequate    Mental Status: mildly sedated but able to meaningfully participate in the post-anesthesia evaluation    Nausea/Vomiting: none    Cardiopulmonary/Hydration status: stable euvolemic    Complications: no apparent anesthesia related complications    Postop vital signs: stable    Dental Exam: Unchanged from Preop    Patient to be discharged home when criteria met.

## 2024-11-25 NOTE — H&P
[facility]  Obstetrics and Gynecology   H&P    Chief complaint:   Scheduled copper IUD insertion        HPI: Susanne Maddox is a 19 year old  with Patient's last menstrual period was 10/23/2024 (exact date).      I initially saw pt 2024 for vulvar pain/itching/irritation related to intercourse, vaginal dryness. Had been treated and tested multiple times for vaginitis and UTI, tested for STI but sx did not resolve  At that time was on OCP, on exam there was considerable dryness/atrophy as well as high tone pelvic floor so we discussed vaginal estrogen or going off OCP to see if sx would improve  We also discussed nexplanon or IUD as other contraceptive options. She had been on nuvaring in the past too and was actually suspected to have an allergic reaction to it with vaginal swelling      Since stopping OCP pt feels completely better. Like things are completely back to normal  She has taken plan B several times and that does seem to flare up symptoms, though  Also condoms are very irritating so she feels like the only choice she and her boyfriend have is to either not have sex or have unprotected sex  Interested in copper IUD, but has really never been able to tolerate speculum exam except for with the \"pediatric\" size    Periods are regular/monthly, can be heavy at times and does get cramps but are manageable  Feels like the benefit of hormone free contraception would outweigh the potential menstrual side effects of paragard IUD         Patient's last menstrual period was 10/23/2024 (exact date).      Meds:  Medications Ordered Prior to Encounter[1]    Past Medical History:    Anxiety state    Heart murmur    Panic attack    Visual impairment    glasses prn     Past Surgical History:   Procedure Laterality Date    Dental surgery procedure  2024    wisdom teeth removal and bone graft     Allergies[2]    Social History     Socioeconomic History    Marital status: Single     Spouse name: Not  on file    Number of children: Not on file    Years of education: Not on file    Highest education level: Not on file   Occupational History    Not on file   Tobacco Use    Smoking status: Never    Smokeless tobacco: Never   Vaping Use    Vaping status: Never Used   Substance and Sexual Activity    Alcohol use: No     Alcohol/week: 0.0 standard drinks of alcohol    Drug use: No    Sexual activity: Yes     Partners: Male   Other Topics Concern    Not on file   Social History Narrative    Not on file     Social Drivers of Health     Financial Resource Strain: Not on file   Food Insecurity: Not on file   Transportation Needs: Not on file   Physical Activity: Not on file   Stress: Not on file   Social Connections: Not on file   Housing Stability: Not on file     Review of Systems:  Constitutional:  Denies fatigue, night sweats, hot flashes  Eyes:  denies blurred or double vision  Cardiovascular:  denies chest pain or palpitations  Respiratory:  denies shortness of breath  Gastrointestinal:  denies heartburn, abdominal pain, diarrhea or constipation  Genitourinary:  denies dysuria, incontinence, abnormal vaginal discharge, vaginal itching  Musculoskeletal:  denies back pain.  Skin/Breast:  Denies any breast pain, lumps, or discharge.   Neurological:  denies headaches, extremity weakness or numbness.  Psychiatric: denies depression or anxiety.  Endocrine:   denies excessive thirst or urination.  Heme/Lymph:  denies history of anemia, easy bruising or bleeding.      PHYSICAL EXAM:     Vitals:    11/11/24 0916 11/25/24 0751   BP:  121/64   Pulse:  107   Resp:  16   Temp:  98.1 °F (36.7 °C)   TempSrc:  Temporal   SpO2:  100%   Weight: 136 lb (61.7 kg) 139 lb 12.8 oz (63.4 kg)   Height: 64\"        Body mass index is 24 kg/m².    Physical Exam:  Gen: A&O, NAD  HEENT: normocephalic, atraumatic  Breast: deferred  CV: regular rate  Lungs: normal effort, no wheezing  Abd: soft, non-distended, non-tender, no rebound, no guarding,  no masses, no hernias  Pelvic: deferred  Ext: non-tender, no edema  Neuro: grossly wnl  Psych: appropriate, full affect      Assessment & Plan:     Susanne Maddox is a 19 year old  female here for contraception, side effects of OCP    Discussed contraceptive options with pt, she has had significant side effects on systemic hormonal contraception and would like to try paragard/copper IUD    We discussed option of trying placement in clinic with cytotec but not sure if she will even be able to tolerate a speculum exam for long enough to place IUD. I feel it is reasonable to perform IUD insertion under anesthesia in the OR    Pt presenting for exam under anesthesia, insertion of copper/paragard IUD under ultrasound guidance in OR  Procedure discussed in detail and she would like to proceed      Traditional site marking is not applicable for this case because it is performed through a natural orifice or a sensitive/painful anatomic site that cannot be directly marked. The patient, consent form, and H&P have been used to correctly identify the procedure.          Mayela Mendez MD  [facility] - OBGYN             [1]   No current facility-administered medications on file prior to encounter.     No current outpatient medications on file prior to encounter.   [2]   Allergies  Allergen Reactions    Penicillins ANAPHYLAXIS    Dust Runny nose     Watery eyes     Nuvaring [Etonogestrel-Ethinyl Estradiol] ITCHING

## 2024-11-25 NOTE — OPERATIVE REPORT
[facility]  Operative Report    Susanne Maddox Patient Status:  Hospital Outpatient Surgery    3/28/2005 MRN [id number]   Location [facility] SURGERY Attending Mayela Mendez MD   Hosp Day # 0 PCP Aniyah Hester MD     Date of Procedure: 24     Preoperative Diagnosis: encounter for IUD insertion    Postoperative Diagnosis: Same     Procedure: Exam under anesthesia, insertion of paragard (copper) IUD under ultrasound guidance    Surgeon: Mayela Mendez MD     Anesthesia: MAC, paracervical block      EBL:  minima     PROCEDURE: This procedure was fully reviewed with the patient and written informed consent was obtained after discussing risks, benefits, indication and alternatives. All questions were answered.     The patient was taken to operating room. SCDs were administered. Anesthesia was administered. She was placed in dorsal lithotomy position. Bimanual exam performed. She was prepped and draped in normal sterile fashion. A bi-valved speculum was placed in the vagina. 2mL of local anesthetic was injected at the anterior lip of the cervix. Then a single tooth tenaculum was used to grasp the anterior lip of the cervix. The remaining 8mL local anesthetic was injected at 4 and 8 o clock at the cervico-vaginal junction. The cervix was gently dilated with Hegar dilators to 5 mm.     The Paragard IUD was prepped and loaded into the . Using ultrasound guidance at bedside the uterus sounded to 8cm. The Paragard IUD was inserted to the uterine fundus and deployed. After the  tube was removed the the uterus the IUD remained in an appropriate position at the fundus. The IUD strings were cut to 2-3cm.    The single tooth tenaculum was removed. Good hemostasis was noted. All instruments were then removed from the vagina. Sponge, lap, needle, and instrument counts correct by two counts. The patient was taken to recovery room in stable condition.     DISPOSITION:  Recovery room       CONDITION: Stable      Mayela Mendez MD  [facility] - OBGYN

## 2024-11-25 NOTE — DISCHARGE INSTRUCTIONS
Take tylenol 1000 mg every 6 hours and ibuprofen 600 mg every 6 hours as needed for pain.  No intercourse for 7 days.  No strenuous activity/exercise for 24 hours  May shower.    Call your physician's office if temperature is 100.4 or greater, you have severe pelvic pain, vaginal bleeding filling more than 1 pad per hour.     Call office for any questions or concerns.     If you have not already scheduled a follow up appointment, please call the office ([phone]) to schedule an appointment in approximately 4-6 weeks to check the IUD strings.       You received a drug called Toradol which is an Anti Inflammatory at:10:10 am  If you are allowed to take Anti inflammatories:    Do not take any Anti Inflammatory like Motrin, Aleve or Ibuprophen until after:4:10 pm  Please report any suspected allergic reactions or bleeding issues to your doctor

## 2025-02-25 ENCOUNTER — PATIENT MESSAGE (OUTPATIENT)
Dept: OBGYN CLINIC | Facility: CLINIC | Age: 20
End: 2025-02-25

## 2025-04-07 ENCOUNTER — PATIENT MESSAGE (OUTPATIENT)
Dept: FAMILY MEDICINE CLINIC | Facility: CLINIC | Age: 20
End: 2025-04-07

## 2025-04-08 NOTE — TELEPHONE ENCOUNTER
Patient called saying that is very urgent to have this form done today.  Her classes are starting very early and school adviser needs this form in order to complete class registration.   Please let patient know when done.

## 2025-04-08 NOTE — TELEPHONE ENCOUNTER
Called and talked to patient,Dr. Hester filled and sign Medical Immunization form .Patient gave permission to  form -Varsha Chakraborty [father] or Varsha Peres [mother]. Form left in front brown folder,1 copy made for scan ,1 copy made for Ma brown folder.

## 2025-04-09 ENCOUNTER — MED REC SCAN ONLY (OUTPATIENT)
Dept: FAMILY MEDICINE CLINIC | Facility: CLINIC | Age: 20
End: 2025-04-09

## (undated) DEVICE — PACK GYNE CUSTOM

## (undated) DEVICE — SLEEVE COMPR MD KNEE LEN SGL USE KENDALL SCD

## (undated) DEVICE — GLOVE SUR 6.5 SENSICARE PI PIP CRM PWD F

## (undated) DEVICE — SOLUTION IRRIG 1000ML 0.9% NACL USP BTL

## (undated) DEVICE — PREMIUM WET SKIN PREP TRAY: Brand: MEDLINE INDUSTRIES, INC.

## (undated) DEVICE — GLOVE SUR 6.5 SENSICARE PI PIP GRN PWD F

## (undated) NOTE — ED AVS SNAPSHOT
[facility] Emergency Department in [address] [place] [address]  Phone:  [phone]  Fax:  Rich Mike   MRN: [id number]    Department:  [facility] Emergency Department in [place]   Date of Visit:  7/13/201 IF THERE IS ANY CHANGE OR WORSENING OF YOUR CONDITION, CALL YOUR PRIMARY CARE PHYSICIAN AT ONCE OR RETURN IMMEDIATELY TO THE EMERGENCY DEPARTMENT.     If you have been prescribed any medication(s), please fill your prescription right away and begin taking t